# Patient Record
Sex: MALE | Race: WHITE | NOT HISPANIC OR LATINO | URBAN - METROPOLITAN AREA
[De-identification: names, ages, dates, MRNs, and addresses within clinical notes are randomized per-mention and may not be internally consistent; named-entity substitution may affect disease eponyms.]

---

## 2024-07-10 ENCOUNTER — OFFICE VISIT (OUTPATIENT)
Dept: URGENT CARE | Facility: CLINIC | Age: 49
End: 2024-07-10
Payer: COMMERCIAL

## 2024-07-10 VITALS
RESPIRATION RATE: 14 BRPM | SYSTOLIC BLOOD PRESSURE: 127 MMHG | OXYGEN SATURATION: 96 % | WEIGHT: 200 LBS | TEMPERATURE: 98 F | DIASTOLIC BLOOD PRESSURE: 82 MMHG | BODY MASS INDEX: 30.41 KG/M2 | HEART RATE: 64 BPM

## 2024-07-10 DIAGNOSIS — H01.00A BLEPHARITIS OF BOTH UPPER AND LOWER EYELID OF RIGHT EYE, UNSPECIFIED TYPE: Primary | ICD-10-CM

## 2024-07-10 PROCEDURE — 99203 OFFICE O/P NEW LOW 30 MIN: CPT | Performed by: PHYSICIAN ASSISTANT

## 2024-07-10 RX ORDER — PANTOPRAZOLE SODIUM 40 MG/1
TABLET, DELAYED RELEASE ORAL
COMMUNITY
Start: 2024-06-21

## 2024-07-10 RX ORDER — ERYTHROMYCIN 5 MG/G
OINTMENT OPHTHALMIC EVERY 6 HOURS
Qty: 3.5 G | Refills: 0 | Status: SHIPPED | OUTPATIENT
Start: 2024-07-10 | End: 2024-07-15

## 2024-07-10 RX ORDER — DOXYCYCLINE 100 MG/1
100 CAPSULE ORAL 2 TIMES DAILY
Qty: 20 CAPSULE | Refills: 0 | Status: SHIPPED | OUTPATIENT
Start: 2024-07-10 | End: 2024-07-20

## 2024-07-10 RX ORDER — OMEGA-3 FATTY ACIDS/FISH OIL 360-1200MG
1 CAPSULE ORAL 2 TIMES DAILY
COMMUNITY
Start: 2023-01-04

## 2024-07-10 RX ORDER — SOD SULF/POT CHLORIDE/MAG SULF 1.479 G
TABLET ORAL
COMMUNITY
Start: 2024-06-10

## 2024-07-25 PROBLEM — H01.00A BLEPHARITIS OF BOTH UPPER AND LOWER EYELID OF RIGHT EYE: Status: ACTIVE | Noted: 2024-07-25

## 2024-07-25 NOTE — PROGRESS NOTES
Subjective   Patient ID: Dylan Pritchett is a 48 y.o. male.    Patient is a 48-year-old male who complains of redness and swelling to his right upper and lower eyelids that has been present for the past 2 days.  Patient denies pain or injury to his right eye.  Patient reports no redness or irritation to the right eye itself.  Patient states that his vision is intact and unchanged.  Patient has noted no bleeding, serous or purulent fluid to his right eyelids.      The following portions of the chart were reviewed this encounter and updated as appropriate:       Review of Systems   Eyes:         Redness and Swelling to Right Eyelids   All other systems reviewed and are negative.  Objective   Physical Exam  Vitals and nursing note reviewed.   Constitutional:       Appearance: Normal appearance. He is normal weight.   HENT:      Head: Normocephalic and atraumatic.      Nose: Nose normal.      Mouth/Throat:      Mouth: Mucous membranes are moist.      Pharynx: Oropharynx is clear.   Eyes:      Extraocular Movements: Extraocular movements intact.      Conjunctiva/sclera: Conjunctivae normal.      Pupils: Pupils are equal, round, and reactive to light.      Comments: Edema and erythema is noted to the right superior and inferior eyelids.  No pustule or other lesion is noted to the eyelid margins.  Right conjunctiva itself is clear without erythema or injection.  No matting or discharge is noted to the right eye.  Right periorbital skin also appears mildly erythematous with no edema noted.  Exam of the left conjunctiva, eyelids and periorbital skin is unremarkable.  Pupils are equal, round and reactive to light and accommodation and the patient demonstrates full extraocular range of motion bilaterally.   Neurological:      Mental Status: He is alert.     Assessment/Plan   Physical exam findings as noted above.  Patient was provided with prescriptions for erythromycin 0.5% ophthalmic ointment and doxycycline 100 mg.   Patient was advised to report to the emergency department if he notes any acute worsening of his symptoms.  Patient verbalizes clear understanding of the above instructions.    CLINICAL IMPRESSION:  Acute Blepharitis Right Superior and Inferior Eyelids    Diagnoses and all orders for this visit:  Blepharitis of both upper and lower eyelid of right eye, unspecified type  -     erythromycin (Romycin) 5 mg/gram (0.5 %) ophthalmic ointment; Apply to right eye every 6 hours for 5 days. Place a 1/2 inch ribbon of ointment into the lower eyelid.  -     doxycycline (Monodox) 100 mg capsule; Take 1 capsule (100 mg) by mouth 2 times a day for 10 days. Take with at least 8 ounces (large glass) of water, do not lie down for 30 minutes after    Patient disposition: Home